# Patient Record
Sex: FEMALE | Race: WHITE | NOT HISPANIC OR LATINO | URBAN - METROPOLITAN AREA
[De-identification: names, ages, dates, MRNs, and addresses within clinical notes are randomized per-mention and may not be internally consistent; named-entity substitution may affect disease eponyms.]

---
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
## 2021-01-20 ENCOUNTER — COMMUNICATION - HEALTHEAST (OUTPATIENT)
Dept: PEDIATRICS | Facility: CLINIC | Age: 21
End: 2021-01-20

## 2021-02-23 ENCOUNTER — COMMUNICATION - HEALTHEAST (OUTPATIENT)
Dept: PEDIATRICS | Facility: CLINIC | Age: 21
End: 2021-02-23

## 2021-02-23 DIAGNOSIS — F41.0 PANIC ATTACK: ICD-10-CM

## 2021-02-24 ENCOUNTER — AMBULATORY - HEALTHEAST (OUTPATIENT)
Dept: PEDIATRICS | Facility: CLINIC | Age: 21
End: 2021-02-24

## 2021-02-24 DIAGNOSIS — F41.0 PANIC ATTACK: ICD-10-CM

## 2021-05-26 ASSESSMENT — PATIENT HEALTH QUESTIONNAIRE - PHQ9
SUM OF ALL RESPONSES TO PHQ QUESTIONS 1-9: 3
SUM OF ALL RESPONSES TO PHQ QUESTIONS 1-9: 5

## 2021-05-27 ASSESSMENT — PATIENT HEALTH QUESTIONNAIRE - PHQ9
SUM OF ALL RESPONSES TO PHQ QUESTIONS 1-9: 3
SUM OF ALL RESPONSES TO PHQ QUESTIONS 1-9: 1

## 2021-05-28 ASSESSMENT — ANXIETY QUESTIONNAIRES
GAD7 TOTAL SCORE: 1
GAD7 TOTAL SCORE: 3
GAD7 TOTAL SCORE: 1
GAD7 TOTAL SCORE: 6

## 2021-05-30 VITALS — WEIGHT: 128.4 LBS

## 2021-05-31 VITALS — HEIGHT: 68 IN | WEIGHT: 130.1 LBS | BODY MASS INDEX: 19.72 KG/M2

## 2021-06-02 VITALS — HEIGHT: 68 IN | WEIGHT: 125.5 LBS | BODY MASS INDEX: 19.02 KG/M2

## 2021-06-07 NOTE — PROGRESS NOTES
"Candelaria Corado is a 19 y.o. female who is being evaluated via a billable telephone visit.      The patient has been notified of following:     \"This telephone visit will be conducted via a call between you and your physician/provider. We have found that certain health care needs can be provided without the need for a physical exam.  This service lets us provide the care you need with a short phone conversation.  If a prescription is necessary we can send it directly to your pharmacy.  If lab work is needed we can place an order for that and you can then stop by our lab to have the test done at a later time.    If during the course of the call the physician/provider feels a telephone visit is not appropriate, you will not be charged for this service.\"         Candelaria Corado complains of    Chief Complaint   Patient presents with     Behavior Problem     feeling anxious, past few days worke up at night feeling dizzy, weird and chest tightness, happped in Oct and Jan       I have reviewed and updated the patient's Past Medical History, Social History, Family History and Medication List.    ALLERGIES  Patient has no known allergies.    Candelaria is a generally healthy 19 year old female for whom I've scheduled a telephone visit to review some recurrent symptoms she feels are likely secondary to anxiety.     She reports always feeling worried about something, but often being able to manage and still function. However, she's had three episodes now where she's felt shaky, blurred vision and feeling unlike herself. The first episode happened about five months ago after returning to college from a weekend at home. The episode was also associtaed with chest tightness, and lasted about a day. The second episode happened two months ago, when she was at home watching her younger siblings while her parents were on a trip. This episode lasteda couple of days. Her most recent episode is ongoing, and she hasn't had the " chest tightness with this episode, but it's lasted longer. It started while she was on Spring Break vacation in [place] with her family. She recently returned home from college due to COVID-19. She just found out classes will all be online now for the rest of the semester due to the pandemic. She's feeling sad about this, but also understands the rationale behind it. She denies having any specific triggers for these episodes; she enjoys being and home and with her family. She's doing well in school. She has friends. She denies any issues with sadness or depression. She hasn't had any thoughts of self harm. She feels safe. She occasionally consumes alcohol, but doesn't feel she uses it to cope with her worrying. No smoking or drug use. She is dating, no sexual activity or chance she could be pregnant.    Candelaria feels her appetite has been normal. She hasn't noticed any fluctuations in her weight. She's sleeping well. She denies any recent URI symptoms. She hasn't had any head injuries or LOC.     Candelaria has never worked with a therapist or counselor. Her brother is on anxiety medication, so this option has come up on conversation with her mom recently. She is open to this idea. Her dad also has anxiety, but I don't believe he's on medication for this.     Assessment/Plan:  Panic attack - symptoms include baseline worrying with episodes of shaking, blurry vision and occasional chest tightness. She feels these symptoms are due to worrying. No headaches or LOC. No weight changes noted. Counseled that we should have a low threshold for having her in for labs and further evaluation, especially if medication isn't helpful to ensure not anemic, thyroid is healthy, metabolic profile is normal, etc. Candelaria contracts to safety today. PHQ-9 Score:  5, INGRIS-7 Score:  6. Counseled on management options including therapy and/or medication initiation. Given the situation with COVID-19, counseling options may be limited. She  is interested in pursuing medication. Counseled on SSRI's, time to benefit, typical side effects, and black box warning. Safety plan in place. Asked her to follow up in 1-2 weeks with phone visit at this time, but could transition to in clinic visit if needed.  - FLUoxetine (PROZAC) 10 MG capsule; Take 1 capsule (10 mg) by mouth daily for 1-2 weeks. If tolerate medication, may increase to 2 capsules (20 mg) daily.  Dispense: 50 capsule; Refill: 0  - Will continue to monitor options for therapy  - Follow up in 1-2 weeks for med check, sooner if needed      Phone call duration:  22 minutes    Candelaria Montague MD

## 2021-06-07 NOTE — TELEPHONE ENCOUNTER
RN cannot approve Refill Request    RN can NOT refill this medication Protocol failed and NO refill given.      Cynthia Mcarthur, [facility] Triage/Med Refill 4/15/2020    Requested Prescriptions   Pending Prescriptions Disp Refills     FLUoxetine (PROZAC) 10 MG capsule [Pharmacy Med Name: FLUOXETINE 10MG CAPSULES] 50 capsule 0     Sig: TAKE 1 CAPSULE BY MOUTH DAILY FOR 1- 2 WEEKS. IF TOLERATE MEDICATION MAY INCREASE TO 2 CAPSULES DAILY       SSRI Refill Protocol  Failed - 4/15/2020  1:24 PM        Failed - PCP or prescribing provider visit in last year     Last office visit with prescriber/PCP: 5/12/2017 Candelaria Montague MD OR same dept: Visit date not found OR same specialty: 5/12/2017 Candelaria Montague MD  Last physical: 12/20/2018 Last MTM visit: Visit date not found   Next visit within 3 mo: Visit date not found  Next physical within 3 mo: Visit date not found  Prescriber OR PCP: Candelaria Montague MD  Last diagnosis associated with med order: 1. Panic attack  - FLUoxetine (PROZAC) 10 MG capsule [Pharmacy Med Name: FLUOXETINE 10MG CAPSULES]; TAKE 1 CAPSULE BY MOUTH DAILY FOR 1- 2 WEEKS. IF TOLERATE MEDICATION MAY INCREASE TO 2 CAPSULES DAILY  Dispense: 50 capsule; Refill: 0    If protocol passes may refill for 12 months if within 3 months of last provider visit (or a total of 15 months).             Failed - Age 21 and younger route to prescribing provider     Last office visit with prescriber/PCP: 5/12/2017 Candelaria Montague MD OR same dept: Visit date not found OR same specialty: 5/12/2017 Candelaria Montague MD  Last physical: 12/20/2018 Last MTM visit: Visit date not found   Next visit within 3 mo: Visit date not found  Next physical within 3 mo: Visit date not found  Prescriber OR PCP: Candelaria Montague MD  Last diagnosis associated with med order: 1. Panic attack  - FLUoxetine (PROZAC) 10 MG capsule [Pharmacy Med Name: FLUOXETINE 10MG CAPSULES]; TAKE 1 CAPSULE BY MOUTH DAILY FOR 1- 2 WEEKS. IF  TOLERATE MEDICATION MAY INCREASE TO 2 CAPSULES DAILY  Dispense: 50 capsule; Refill: 0    If protocol passes may refill for 12 months if within 3 months of last provider visit (or a total of 15 months).

## 2021-06-07 NOTE — TELEPHONE ENCOUNTER
Does Candelaria have enough to make it to our med check next week? If not, is she on the 1 pill (10 mg) or 2 pills (20 mg) per day? Thanks.

## 2021-06-07 NOTE — PROGRESS NOTES
"Candelaria Corado is a 20 y.o. female who is being evaluated via a billable telephone visit.      The patient has been notified of following:     \"This telephone visit will be conducted via a call between you and your physician/provider. We have found that certain health care needs can be provided without the need for a physical exam.  This service lets us provide the care you need with a short phone conversation.  If a prescription is necessary we can send it directly to your pharmacy.  If lab work is needed we can place an order for that and you can then stop by our lab to have the test done at a later time.    Telephone visits are billed at different rates depending on your insurance coverage. During this emergency period, for some insurers they may be billed the same as an in-person visit.  Please reach out to your insurance provider with any questions.    If during the course of the call the physician/provider feels a telephone visit is not appropriate, you will not be charged for this service.\"    Patient has given verbal consent to a Telephone visit? Yes    Patient would like to receive their AVS by AVS Preference: Mail a copy.     PHONE VISIT  Due to current COVID19 pandemic, pt was offered virtual visit in lieu of in office evaluation to limit exposures.      Assessment/plan  Candelaria Corado is a 20 y.o. female who is established to my practice.    Panic attack - finding good benefit from fluoxetine 10 mg, but notes still has several days where she feels overwhelmed. She's interested in trying increased dose. PHQ-9 Score:  1, INGRIS-7 Score:  1. Contracts to safety.  - FLUoxetine (PROZAC) 20 MG capsule; Take 1 capsule (20 mg total) by mouth daily.  - Continue to consider therapy/ counseling  - Follow up in 2-3 months, sooner if needed      COVID19 precautions reviewed, questions answered. Referred to CDC for latest updates.     Telephone visit start time: 10:45 AM  Telephone visit end time: 10:55 " AM  Total time:     Follow up: 10 minutes    Candelaria Montague MD    Subjective:      HPI: Candelaria Corado is a 20 y.o. female who is being evaluated via a billable telephone visit due to COVID19 pandemic precautions.    Chief Complaint   Patient presents with     Med Check     going pretty good, seems like it could be upped, but hasn't been super bad     Candelaria is a generally healthy 20 year old female on whom I'm conducting a telephone visit for a med check. She contacted clinic last month with concerns for a few episodes of shaking, blurred vision and chest tightness. She was suspicious these episodes were secondary to anxiety. She noted baseline anxiety that she's managed over time, but then there are periods where the anxiety gets worse and makes it hard to function. I started her on fluoxetine 10 mg, which she's been on since. I prescribed enough to increase to 20 mg if desired, but she hasn't done this yet.    She's tolerated the medication without side effects. She takes it in the morning. She's noted definite benefits, and doesn't think her anxiety is as prominent as it used to be. She still still has days periodically though where things feel really overwhelming. School is her primary trigger, and finals are approaching. She feels like she's doing okay with the transition to online/distant learning for her [employer] classes, but it's been challenging to be away from her friends. She's trying to stay connected to them and also get exercise. She's living with her family currently.     She denies any issues with side effects. She sometimes has trouble staying asleep, but this hasn't gotten worse with the medication. She has no safety concerns. No history or current thoughts of self harm. No substance abuse.     Review of Systems:  12 point comprehensive review of systems was negative except as noted and HPI     Social History:  Social History     Social History Narrative    Lives with   parents, younger sister (Charo) and younger brother.    Mom works for airline.    Planning on going to college in Fall 2019       Medical History:   I have reviewed and updated the patient's Past Medical History, Social History, Family History and Medication List.    Medications:  Current Outpatient Medications   Medication Sig Dispense Refill     FLUoxetine (PROZAC) 20 MG capsule Take 1 capsule (20 mg total) by mouth daily. 30 capsule 2     No current facility-administered medications for this visit.        Imaging & Labs reviewed this visit: none      Objective:      There were no vitals filed for this visit.    Physical Exam: Not performed in context of phone visit    Candelaria Montague MD

## 2021-06-07 NOTE — TELEPHONE ENCOUNTER
Reached out to patient and was informed she is taking one capsule daily, and will have a sufficient quantity to hold her over until her medication check appointment. Thank you, Chiquis White

## 2021-06-07 NOTE — PROGRESS NOTES
"Candelaria Corado is a 19 y.o. female who is being evaluated via a billable telephone visit.      The patient has been notified of following:     \"This telephone visit will be conducted via a call between you and your physician/provider. We have found that certain health care needs can be provided without the need for a physical exam.  This service lets us provide the care you need with a short phone conversation.  If a prescription is necessary we can send it directly to your pharmacy.  If lab work is needed we can place an order for that and you can then stop by our lab to have the test done at a later time.    If during the course of the call the physician/provider feels a telephone visit is not appropriate, you will not be charged for this service.\"         Candelaria Corado complains of    Chief Complaint   Patient presents with     Med check     going good, feeling better       I have reviewed and updated the patient's Past Medical History, Social History, Family History and Medication List.    ALLERGIES  Patient has no known allergies.    Candelaria is a generally healthy 19 year old female for whom I've scheduled a telephone visit to review how she's doing since starting fluoxetine last week. She reached out last week after returning home from college due to COVID. She noted having a few episodes over the past six months or so when she felt shaky, vision got blurry and some chest tightness. She felt highly suspicious that these episodes were secondary to anxiety. She reported having baseline anxiety that she managed and coped with, but thinks there are periods where it gets worse and harder to function. The day we spoke last week, she was in the midst of an episode. She started the fluoxetine 10 mg that day, and since then, she hasn't had any episodes.    She's adjusting to having to do her college courses online. She's also adjusting to being back at home. Overall, this week has been good. She hasn't " had any issues or noticed any side effects on the medication. She's sleeping well, eating normally, and no issues with headaches or stomach aches.     She hasn't had any safety concerns, no thoughts of self harm.     At this point, Candelaria is still on the 10 mg of fluoxetine, and isn't sure if she should go up to 20 mg like we discussed.      Assessment/Plan:  Panic attack - generally healthy 19 year old for med check after starting fluoxetine 10 mg last week for episodes of intense panic attacks. She feels good on the medication, no side effects. At this point, we'll stick with current dose, but she's aware that she has the option of increasing to 20 mg if she starts to feel things could be better. PHQ-9 Score:  3 (was 5 last week), and INGRIS-7 Score:  3 (was 6 last week). No refills needed at this time. Contracts to safety.  - Med check in 3 weeks, sooner if concerns arise        Phone call duration:  9 minutes    Candelaria Montague MD

## 2021-06-09 NOTE — TELEPHONE ENCOUNTER
RN cannot approve Refill Request    RN can NOT refill this medication med is not covered by policy/route to provider. Last office visit: 5/12/2017 Candelaria Montague MD Last Physical: 12/20/2018 Last MTM visit: Visit date not found Last visit same specialty: 5/12/2017 Candelaria Montague MD.  Next visit within 3 mo: Visit date not found  Next physical within 3 mo: Visit date not found      Emily Burden, [facility] Triage/Med Refill 7/25/2020    Requested Prescriptions   Pending Prescriptions Disp Refills     FLUoxetine (PROZAC) 20 MG capsule [Pharmacy Med Name: FLUOXETINE 20MG CAPSULES] 30 capsule 2     Sig: TAKE 1 CAPSULE(20 MG) BY MOUTH DAILY       SSRI Refill Protocol  Failed - 7/24/2020 11:42 AM        Failed - Age 21 and younger route to prescribing provider     Last office visit with prescriber/PCP: 5/12/2017 Candelaria Montague MD OR same dept: Visit date not found OR same specialty: 5/12/2017 Candelaria Montague MD  Last physical: 12/20/2018 Last MTM visit: Visit date not found   Next visit within 3 mo: Visit date not found  Next physical within 3 mo: Visit date not found  Prescriber OR PCP: Candelaria Montague MD  Last diagnosis associated with med order: 1. Panic attack  - FLUoxetine (PROZAC) 20 MG capsule [Pharmacy Med Name: FLUOXETINE 20MG CAPSULES]; TAKE 1 CAPSULE(20 MG) BY MOUTH DAILY  Dispense: 30 capsule; Refill: 2    If protocol passes may refill for 12 months if within 3 months of last provider visit (or a total of 15 months).             Passed - PCP or prescribing provider visit in last year     Last office visit with prescriber/PCP: 5/12/2017 Candelaria Montague MD OR same dept: Visit date not found OR same specialty: 5/12/2017 Candelaria Montague MD  Last physical: 12/20/2018 Last MTM visit: Visit date not found   Next visit within 3 mo: Visit date not found  Next physical within 3 mo: Visit date not found  Prescriber OR PCP: Candelaria Montague MD  Last diagnosis associated with med order: 1.  Panic attack  - FLUoxetine (PROZAC) 20 MG capsule [Pharmacy Med Name: FLUOXETINE 20MG CAPSULES]; TAKE 1 CAPSULE(20 MG) BY MOUTH DAILY  Dispense: 30 capsule; Refill: 2    If protocol passes may refill for 12 months if within 3 months of last provider visit (or a total of 15 months).

## 2021-06-09 NOTE — TELEPHONE ENCOUNTER
Due for med check, in clinic preferred, but virtual okay. Does she have enough to get her through until we can get this set up?

## 2021-06-10 NOTE — PROGRESS NOTES
"Candelaria Corado is a 20 y.o. female who is being evaluated via a billable video visit.      The patient has been notified of following:     \"This video visit will be conducted via a call between you and your physician/provider. We have found that certain health care needs can be provided without the need for an in-person physical exam.  This service lets us provide the care you need with a video conversation.  If a prescription is necessary we can send it directly to your pharmacy.  If lab work is needed we can place an order for that and you can then stop by our lab to have the test done at a later time.    Video visits are billed at different rates depending on your insurance coverage. Please reach out to your insurance provider with any questions.    If during the course of the call the physician/provider feels a video visit is not appropriate, you will not be charged for this service.\"    Patient has given verbal consent to a Video visit? Yes  How would you like to obtain your AVS? AVS Preference: MyChart.  If dropped by the video visit, the video invitation should be sent to: Text to cell phone: [phone]  Will anyone else be joining your video visit? No            VIDEO VISIT  Due to current COVID19 pandemic, pt was offered virtual visit in lieu of in office evaluation to limit exposures.      Assessment/plan  Candelaria Corado is a 20 y.o. female who is established to my practice.    Panic attack - has noticed great benefit from fluoxetine, went up to 20 mg in April, with good result. Hasn't had any panic attacks or noticed her anxiety since then. She's returning to college in a few weeks, and school often triggers anxiety. She's comfortable monitoring, but will follow up if symptoms worsen. PHQ-9 Score:  1, INGRIS-7 Score:  1. Contracts to safety.  - FLUoxetine (PROZAC) 20 MG capsule; Take 1 capsule (20 mg total) by mouth daily.  - [facility]needed  - Follow up in " 6 months for med check and Physical, sooner if needed      COVID19 precautions reviewed, questions answered. Referred to [facility] for latest updates.       Subjective:      HPI: Candelaria Corado is a 20 y.o. female who is being evaluated via a billable video visit due to COVID19 pandemic precautions.    Chief Complaint   Patient presents with     Med Check     thinks it has been really good     Candelaria is a generally healthy 20 year old with history of intermittent panic attacks on whom I'm conducting a virtual visit to discuss how she's doing on fluoxetine. We started her on 10 mg in March, 2020. We followed up a couple weeks later, and she felt like there was some benefit, but room for improvement. We increased to 20 mg at this time, and since then, she's reported really good result. She completed her online college courses for [employer] and did well overall. She's been having a relaxing summer. She will return to [place] in a few weeks for mostly online courses, but two in-person courses. School often triggers her panic attacks, so will monitor things closely.    No issues with adherence with taking the medication. She hasn't had issues with side effects; no sleep trouble, appetite has been fine. She denies any thoughts of self-harm or safety concerns.     Review of Systems:  12 point comprehensive review of systems was negative except as noted and HPI     Social History:  Social History     Social History Narrative    Lives with  parents, younger sister (Charo) and younger brother.    Mom works for airline.    Planning on going to college in Fall 2019       Medical History:   I have reviewed and updated the patient's Past Medical History, Social History, Family History and Medication List.    Medications:  Current Outpatient Medications   Medication Sig Dispense Refill     FLUoxetine (PROZAC) 20 MG capsule Take 1 capsule (20 mg total) by mouth daily. 30 capsule 5     No current facility-administered  medications for this visit.        Imaging & Labs reviewed this visit: none      Objective:      There were no vitals filed for this visit.    Physical Exam: Not performed in context of virtual visit, but patient is the primary historian and appears well, no acute distress      Video-Visit Details    Type of service:  Video Visit    Video Start Time: 9:51 AM  Video End Time (time video stopped): 9:59 AM  Originating Location (pt. Location): Home    Distant Location (provider location):  [facility]     Platform used for Video Visit: Jasiel Montague MD

## 2021-06-10 NOTE — PROGRESS NOTES
[facility] Pediatric Acute Visit     HPI:  Candelaria Corado is a 17 y.o. female who presents to the clinic with a three day history of constant, dull headache with intermittent dizziness and nausea. The day before symptom onset, she was at swim practice and scraped her neck on the bottom of the pool when she was playing around with teammates. No trauma to her head or LOC. She's noticed a scrape on her neck, but no significant neck pain otherwise. Mom is questioning concussion for Candelaria. She hasn't had any nausea or vomiting. No mental status changes. She's had AP testing this week, and didn't notice any difficulty completing this testing nor did she have any exacerbation of symptoms with testing. She's been able to swim since, but thinks maybe her headache is worse when she swims. She feels achy all over. No URI symptoms. Her energy is decreased. Her appetite is decreased. She drinks 2-4 water bottles daily. She isn't a big breakfast eater. She also doesn't consume dairy. She denies rashes or joint pain. She has no known sick contacts. She's been under some stress lately with testing, but denies depression or anxiety.     Past Med / Surg History:  No past medical history on file.  No past surgical history on file.    Fam / Soc History:  Family History   Problem Relation Age of Onset     Hyperlipidemia Father      Social History     Social History Narrative     ROS:  Gen: See HPI  Eyes: No eye discharge  ENT: No nasal congestion or rhinorrhea. No pharyngitis. No otalgia.  Resp: No SOB, cough or wheezing  GI: No diarrhea, nausea or vomiting  : No dysuria  MS: No joint/bone/muscle tenderness  Skin: No rashes  Neuro: See HPI  Lymph/Hematologic: No noted gland swelling    Objective:  Vitals: /70  Temp 98.1  F (36.7  C) (Oral)   Wt 128 lb 6.4 oz (58.2 kg)    Gen: Alert, well appearing  ENT: TMs normal bilaterally; no nasal congestion or rhinorrhea; oropharynx normal, moist mucosa  Eyes: Conjunctivae  clear bilaterally  Heart: Regular rate and rhythm; normal S1 and S2; no murmurs, gallops, or rubs  Lungs: Unlabored respirations; clear breath sounds  Musculoskeletal: Joints with full range-of-motion. Normal upper and lower extremities.  Skin: Normal without rashes  Neuro: Oriented x 3; PERRL, EOMI; CN II-XII intact; strength 5/5 upper and lower extremities; sensation to light touch intact; patellar reflexes intact; gait and coordination normal  Spine:  Healing eschar overlying bony prominence of C7; no pain with palpation along neck or spine with full ROM  Hematologic/Lymph/Immune: No cervical lymphadenopathy  Psychiatric: Appropriate affect    Assessment and Plan:    Candelaria Corado is a 17  y.o. 0  m.o. female with a headache and intermittent dizziness with nausea all noticed one day after she scraped her back/neck along the bottom of the pool. I suspect either viral illness vs stress vs poor hydration/nutrition as being responsible for symptoms rather than the trauma. I am not highly suspicious of concussion since there was no head involvement and trauma was fairly mild in general.       Encouraged fluids, rest and analgesics as needed for now    Recommended drinking at least 5-6 bottles of water a day and eating protein of some sort for breakfast; also encouraged good sleep hygiene    Follow up if symptoms don't improve after another 1-2 weeks, sooner if gets worse    Candelaria Montague MD  5/12/2017

## 2021-06-10 NOTE — TELEPHONE ENCOUNTER
Called Candelaria and scheduled a video visit for 7/31/20 with Eddi.  She said she only has a couple days worth of the medicine left.  Meri Gamino LPN

## 2021-06-10 NOTE — TELEPHONE ENCOUNTER
Left a message to call back. Please assist patient with a medication check. This can be done virtually.

## 2021-06-14 NOTE — PROGRESS NOTES
[facility] Well Child Check    ASSESSMENT & PLAN  Candelaria Corado is a 17  y.o. 7  m.o. who has normal growth and normal development.    Diagnoses and all orders for this visit:    Annual physical exam    HPV vaccine 9 valent 3 dose IM    Meningococcal MCV4P    Lipid profile    Vitamin D profile    Congenital nevus of face    Followed by Dermatology    Abnormal vision screen, left eye    Encouraged being evaluated by Ophthalmologist    Return to clinic in 1 year for a Well Child Check or sooner as needed    IMMUNIZATIONS/LABS  Immunizations were reviewed and orders were placed as appropriate.    REFERRALS  Dental:  The patient has already established care with a dentist.  Other:  No additional referrals were made at this time.    ANTICIPATORY GUIDANCE  I have reviewed age appropriate anticipatory guidance.    HEALTH HISTORY  Do you have any concerns that you'd like to discuss today?: No concerns       Roomed by: CAMERON Harvey CMA    Refills needed? No    Do you have any forms that need to be filled out? No        Do you have any significant health concerns in your family history?: No  Family History   Problem Relation Age of Onset     Hyperlipidemia Father      Since your last visit, have there been any major changes in your family, such as a move, job change, separation, divorce, or death in the family?: No    Home  Who lives in your home?:  Lives with mom, dad, sister, and brother.   Social History     Social History Narrative     Do you have any trouble with sleep?:  No    Education  What school does your child attend?:  [employer]   What grade is your child in?:  11th  How does the patient perform in school (grades, behavior, attention, homework?: A's      Eating  Does patient eat regular meals including fruits and vegetables?:  yes  What is the patient drinking (cow's milk, water, soda, juice, sports drinks, energy drinks, etc)?: water  Does patient have concerns about body or appearance?:   "No    Activities  Does the patient have friends?:  yes  Does the patient get at least one hour of physical activity per day?:  yes  Does the patient have less than 2 hours of screen time per day (aside from homework)?:  yes  What does your child do for exercise?:  Swimming.   Does the patient have interest/participate in community activities/volunteers/school sports?:  no    MENTAL HEALTH SCREENING  PHQ-2 Total Score: 0 (2017  4:41 PM)    VISION/HEARING  Vision: Completed. See Results  Hearing:  Completed. See Results     Hearing Screening    125Hz 250Hz 500Hz 1000Hz 2000Hz 3000Hz 4000Hz 6000Hz 8000Hz   Right ear:   Pass Pass Pass  Pass     Left ear:   Pass Pass Pass  Pass        Visual Acuity Screening    Right eye Left eye Both eyes   Without correction: 20/30 20/40 20/30   With correction:          TB Risk Assessment:  The patient and/or parent/guardian answer positive to:  patient and/or parent/guardian answer 'no' to all screening TB questions    Dental  Is your child being seen by a dentist?  Yes  Flouride Varnish Application Screening  Is child seen by dentist?     Yes    Patient Active Problem List   Diagnosis     Congenital nevus of face       Drugs  Does the patient use tobacco/alcohol/drugs?:  no    Safety  Does the patient have any safety concerns (peer or home)?:  no  Does the patient use safety belts, helmets and other safety equipment?:  yes    Sex  Is the patient sexually active?:  no    MEASUREMENTS  Height:  5' 8\" (1.727 m)  Weight: 130 lb 1.6 oz (59 kg)  BMI: Body mass index is 19.78 kg/(m^2).  Blood Pressure: 120/60  Blood pressure percentiles are 69 % systolic and 23 % diastolic based on NHBPEP's 4th Report. Blood pressure percentile targets: 90: 128/82, 95: 132/86, 99 + 5 mmH/99.    PHYSICAL EXAM  GEN: alert, well appearing  EYES: clear  R EAR: canal clear, TM pearly gray  L EAR: canal clear, TM pearly gray  NOSE: clear  OROPHARYNX: clear  NECK: supple, no significant LAD  CVS: RRR, " normal S1/S2, no murmur  LUNGS: clear, no increased work of breathing  ABD: soft, non-tender, non-distended  : SMR 4 breast and pubic hair  EXT: warm, well perfused, no swelling  MSK: nl muscle bulk, spine straight  NEURO: CN grossly intact, nl strength in UE and LE, nl gait, no dysmetria  SKIN: pigmented nevus on right cheek/preauricular area, about 1.5 cm x 2 cm

## 2021-06-14 NOTE — TELEPHONE ENCOUNTER
Based review of the scheduling protocol, the patient would meet criteria for a virtual visit, however they are out of state or will be at the time of the visit.  Does this patient meet criteria, in your clinical judgement, to proceed with a virtual visit?   Please work directly with the patient to arrange the appropriate virtual vs in person visit.     Patient will be in [place] at school for a while, please assist scheduling patient with virtual visit if able or advise on what she should do to get her refills.

## 2021-06-14 NOTE — TELEPHONE ENCOUNTER
Lmtcb: patient overdue for yearly physical and also due for 6 month medication f/u. Please help schedule appointment.    Rosemarie DHILLON CMA ([facility])

## 2021-06-22 NOTE — PROGRESS NOTES
[facility] Well Child Check    ASSESSMENT & PLAN  Candelaria Corado is a 18  y.o. 8  m.o. who has normal growth and normal development.    Diagnoses and all orders for this visit:    Encounter for routine child health examination without abnormal findings  -     Influenza, Seasonal Quad, Preservative Free 36+ Months (syringe)    Congenital nevus of face    Picky eater - doesn't get much calcium-containing foods; also doesn't eat breakfast. Has lost about 5 lbs since last visit a year ago. BMI went from about 37th%ile to 19th%ile. Thinks it's due to losing muscle mass from not swimming anymore. Denies any intentional weight loss, restricting calories, purging or laxative use.  - Encouraged her to eat breakfast, suggested protein bar or something portable and quick to fit lifestyle  - Recommended trying Lactaid or other product to see if can tolerate dairy, but she doesn't like the taste of milk or dairy. So, suggested MV with calcium and Vitamin D. Has been Vitamin D insufficient in the past, most recent check in normal range. Counseled on importance for nutrition, bone growth.      Return to clinic in 1 year for a Well Child Check or sooner as needed    IMMUNIZATIONS/LABS  Immunizations were reviewed and orders were placed as appropriate.    REFERRALS  Dental:  The patient has already established care with a dentist.  Other:  No additional referrals were made at this time.    ANTICIPATORY GUIDANCE  I have reviewed age appropriate anticipatory guidance.    HEALTH HISTORY  Do you have any concerns that you'd like to discuss today?: No concerns       Roomed by: Prema CERVANTES LPN    Accompanied by Mother    Refills needed? No    Do you have any forms that need to be filled out? No        Do you have any significant health concerns in your family history?: No  Family History   Problem Relation Age of Onset     Hyperlipidemia Father      Since your last visit, have there been any major changes in your family, such as a  move, job change, separation, divorce, or death in the family?: No  Has a lack of transportation kept you from medical appointments?: No    Home  Who lives in your home?:  Mom, Dad sister and brother  Social History     Social History Narrative     Not on file     Do you have any concerns about losing your housing?: No  Is your housing safe and comfortable?: Yes  Do you have any trouble with sleep?:  No    Education  What school do you child attend?:  [employer]  What grade are you in?:  12th  How do you perform in school (grades, behavior, attention, homework?: well     Eating  Do you eat regular meals including fruits and vegetables?:  yes  What are you drinking (cow's milk, water, soda, juice, sports drinks, energy drinks, etc)?: water  Have you been worried that you don't have enough food?: No  Do you have concerns about your body or appearance?:  No    Activities  Do you have friends?:  yes  Do you get at least one hour of physical activity per day?:  no  How many hours a day are you in front of a screen other than for schoolwork (computer, TV, phone)?:  1  What do you do for exercise?:  none  Do you have interest/participate in community activities/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 See Results  Hearing:  Completed. See Results     Hearing Screening    125Hz 250Hz 500Hz 1000Hz 2000Hz 3000Hz 4000Hz 6000Hz 8000Hz   Right ear:   25 20 20  20 20    Left ear:   20 20 20  20 20       Visual Acuity Screening    Right eye Left eye Both eyes   Without correction: 10/8 10/8 10/8   With correction:      Comments: Plus Lens: Pass: blurring of vision with +2.50 lens glasses      TB Risk Assessment:  The patient and/or parent/guardian answer positive to:  patient and/or parent/guardian answer 'no' to all screening TB questions    Dyslipidemia Risk Screening  Have either of your parents or any of your grandparents had a  "stroke or heart attack before age 55?: No  Any parents with high cholesterol or currently taking medications to treat?: No     Dental  When was the last time you saw the dentist?: 3-6 months ago   Parent/Guardian declines the fluoride varnish application today. Fluoride not applied today.    Patient Active Problem List   Diagnosis     Congenital nevus of face       Drugs  Does the patient use tobacco/alcohol/drugs?:  no    Safety  Does the patient have any safety concerns (peer or home)?:  no  Does the patient use safety belts, helmets and other safety equipment?:  yes    Sex  Have you ever had sex?:  No    MEASUREMENTS  Height:  5' 8.25\" (1.734 m)  Weight: 125 lb 8 oz (56.9 kg)  BMI: Body mass index is 18.94 kg/m .  Blood Pressure: 122/84  Blood pressure percentiles are 83 % systolic and 97 % diastolic based on the 2017 AAP Clinical Practice Guideline. Blood pressure percentile targets: 90: 126/78, 95: 129/82, 95 + 12 mmH/94. This reading is in the Stage 1 hypertension range (BP >= 130/80).    PHYSICAL EXAM  GEN: alert, well appearing  EYES: clear  R EAR: canal clear, TM pearly gray  L EAR: canal clear, TM pearly gray  NOSE: clear  OROPHARYNX: clear  NECK: supple, no significant LAD  CVS: RRR, no murmur  LUNGS: clear, no increased work of breathing  ABD: soft, non-tender, non-distended  : SMR 4  EXT: warm, well perfused, no swelling  MSK: nl muscle bulk, spine straight  NEURO: CN grossly intact, nl strength in UE and LE, nl gait, no dysmetria  SKIN: nevus on right pre auricular skin ~ 2 cm x 3.5 cm    "

## 2021-06-23 NOTE — TELEPHONE ENCOUNTER
We typically recommend anti-malarials and consider rabies and typhoid vaccines depending on risk factors. If family is interested in pursuing this, we can schedule a travel consult for them. The CDC (cdc.gov) is a great resource to review travel guidelines if family is interested.     Please note this should apply to both Candelaria and her sister, Charo.

## 2021-06-23 NOTE — TELEPHONE ENCOUNTER
Called mom and recommendation given for scheduling for a travel.  Mom will call back to schedule after reviewing CDC . Meri Gamino LPN

## 2021-06-23 NOTE — TELEPHONE ENCOUNTER
Who is calling:  Mother  Reason for Call:    Patient will be traveling to the [place] in march and mother is wondering if the patient will need any further immunizations.  They will [place] resort and populated areas.  Date of last appointment with primary care:  12/20/2018  Has the patient been recently seen:  No  Okay to leave a detailed message: No      [phone]

## 2021-07-25 ENCOUNTER — HEALTH MAINTENANCE LETTER (OUTPATIENT)
Age: 21
End: 2021-07-25

## 2021-09-19 ENCOUNTER — HEALTH MAINTENANCE LETTER (OUTPATIENT)
Age: 21
End: 2021-09-19

## 2022-08-21 ENCOUNTER — HEALTH MAINTENANCE LETTER (OUTPATIENT)
Age: 22
End: 2022-08-21

## 2022-11-21 ENCOUNTER — HEALTH MAINTENANCE LETTER (OUTPATIENT)
Age: 22
End: 2022-11-21

## 2023-09-16 ENCOUNTER — HEALTH MAINTENANCE LETTER (OUTPATIENT)
Age: 23
End: 2023-09-16